# Patient Record
Sex: FEMALE | Race: ASIAN
[De-identification: names, ages, dates, MRNs, and addresses within clinical notes are randomized per-mention and may not be internally consistent; named-entity substitution may affect disease eponyms.]

---

## 2018-05-29 ENCOUNTER — HOSPITAL ENCOUNTER (OUTPATIENT)
Dept: HOSPITAL 92 - SCSMAMMO | Age: 38
Discharge: HOME | End: 2018-05-29
Attending: FAMILY MEDICINE
Payer: COMMERCIAL

## 2018-05-29 DIAGNOSIS — Z12.31: Primary | ICD-10-CM

## 2018-05-29 PROCEDURE — 77067 SCR MAMMO BI INCL CAD: CPT

## 2018-05-29 NOTE — MMO
BASELINE SCREENING MAMMOGRAMS:

 

05/29/2018

 

HISTORY:

Screening mammogram.

 

COMPARISON:

None.

 

FINDINGS:

The patient's mammogram is interpreted with the assistance of computer aided detection.

 

The breast parenchyma is heterogeneously dense, limiting mammographic sensitivity.  No dominant mass 
or architectural distortion.  No concerning microcalcifications are seen.  Scattered benign microcalc
ification is noted on the left.

 

IMPRESSION:

BI-RADS Category 2: Benign findings.

 

Recommend annual screening mammography.

 

POS: [facility]

## 2019-06-11 ENCOUNTER — HOSPITAL ENCOUNTER (OUTPATIENT)
Dept: HOSPITAL 92 - SCSMAMMO | Age: 39
Discharge: HOME | End: 2019-06-11
Attending: FAMILY MEDICINE
Payer: COMMERCIAL

## 2019-06-11 DIAGNOSIS — Z80.3: ICD-10-CM

## 2019-06-11 DIAGNOSIS — Z12.31: Primary | ICD-10-CM

## 2019-06-11 PROCEDURE — 77067 SCR MAMMO BI INCL CAD: CPT

## 2019-06-11 NOTE — MMO
Bilateral MAMMO Bilat Screen [facility].

 

CLINICAL HISTORY:

Patient is 39 years old and is seen for screening. The patient has the following

family history of breast cancer:  grandmother.  The patient has no personal

history of cancer.

 

VIEWS:

The views performed were:  bilateral craniocaudal and bilateral mediolateral

oblique.

 

FILMS COMPARED:

The present examination has been compared to a prior imaging study performed at

[facility] on 05/29/2018.

 

This study has been interpreted with the assistance of computer-aided detection.

 

MAMMOGRAM FINDINGS:

The breasts are extremely dense, which may lower the sensitivity of mammography.

 

There are no suspicious masses, suspicious calcifications, or new areas of

architectural distortion.

 

IMPRESSION:

THERE IS NO MAMMOGRAPHIC EVIDENCE OF MALIGNANCY.

 

A ROUTINE FOLLOW-UP MAMMOGRAM IN 1 YEAR IS RECOMMENDED.

 

ACR BI-RADS Category 1 - Negative

 

MAMMOGRAPHY NOTE:

 1. A negative mammogram report should not delay a biopsy if a dominant of

 clinically suspicious mass is present.

 2. Approximately 10% to 15% of breast cancers are not detected by

 mammography.

 3. Adenosis and dense breasts may obscure an underlying neoplasm.

## 2022-02-14 ENCOUNTER — HOSPITAL ENCOUNTER (OUTPATIENT)
Dept: HOSPITAL 92 - CSHMAMMO | Age: 42
Discharge: HOME | End: 2022-02-14
Attending: FAMILY MEDICINE
Payer: COMMERCIAL

## 2022-02-14 DIAGNOSIS — Z80.3: ICD-10-CM

## 2022-02-14 DIAGNOSIS — Z12.31: Primary | ICD-10-CM

## 2022-02-14 PROCEDURE — 77063 BREAST TOMOSYNTHESIS BI: CPT

## 2022-02-14 PROCEDURE — 77067 SCR MAMMO BI INCL CAD: CPT

## 2022-08-19 ENCOUNTER — HOSPITAL ENCOUNTER (OUTPATIENT)
Dept: HOSPITAL 92 - CSHULT | Age: 42
Discharge: HOME | End: 2022-08-19
Attending: FAMILY MEDICINE
Payer: COMMERCIAL

## 2022-08-19 DIAGNOSIS — Q51.9: ICD-10-CM

## 2022-08-19 DIAGNOSIS — N94.6: Primary | ICD-10-CM

## 2022-08-19 PROCEDURE — 76856 US EXAM PELVIC COMPLETE: CPT

## 2022-09-08 ENCOUNTER — HOSPITAL ENCOUNTER (OUTPATIENT)
Dept: HOSPITAL 92 - TBSIIMAG | Age: 42
Discharge: HOME | End: 2022-09-08
Attending: FAMILY MEDICINE
Payer: COMMERCIAL

## 2022-09-08 DIAGNOSIS — N81.4: ICD-10-CM

## 2022-09-08 DIAGNOSIS — N88.8: Primary | ICD-10-CM

## 2022-09-08 PROCEDURE — A9579 GAD-BASE MR CONTRAST NOS,1ML: HCPCS

## 2022-09-08 PROCEDURE — 72197 MRI PELVIS W/O & W/DYE: CPT

## 2023-03-01 ENCOUNTER — HOSPITAL ENCOUNTER (OUTPATIENT)
Dept: HOSPITAL 92 - CSHMAMMO | Age: 43
Discharge: HOME | End: 2023-03-01
Attending: FAMILY MEDICINE
Payer: COMMERCIAL

## 2023-03-01 DIAGNOSIS — Z12.31: Primary | ICD-10-CM

## 2023-03-01 DIAGNOSIS — Z80.3: ICD-10-CM

## 2023-03-01 PROCEDURE — 77063 BREAST TOMOSYNTHESIS BI: CPT

## 2023-03-01 PROCEDURE — 77067 SCR MAMMO BI INCL CAD: CPT

## 2024-05-23 ENCOUNTER — HOSPITAL ENCOUNTER (OUTPATIENT)
Dept: HOSPITAL 92 - CSHMAMMO | Age: 44
Discharge: HOME | End: 2024-05-23
Attending: FAMILY MEDICINE
Payer: COMMERCIAL

## 2024-05-23 DIAGNOSIS — Z80.3: ICD-10-CM

## 2024-05-23 DIAGNOSIS — Z12.31: Primary | ICD-10-CM

## 2024-05-23 PROCEDURE — 77063 BREAST TOMOSYNTHESIS BI: CPT

## 2024-05-23 PROCEDURE — 77067 SCR MAMMO BI INCL CAD: CPT
